# Patient Record
Sex: MALE | Race: WHITE | NOT HISPANIC OR LATINO | Employment: FULL TIME | URBAN - METROPOLITAN AREA
[De-identification: names, ages, dates, MRNs, and addresses within clinical notes are randomized per-mention and may not be internally consistent; named-entity substitution may affect disease eponyms.]

---

## 2023-02-26 ENCOUNTER — NURSE TRIAGE (OUTPATIENT)
Dept: NURSING | Facility: CLINIC | Age: 23
End: 2023-02-26
Payer: COMMERCIAL

## 2023-02-26 NOTE — TELEPHONE ENCOUNTER
Nosebleed today x 20-30 min. Gets nosebleeds often over past 1 year. Today has used correct nose pinching technique for 10 min continuously w/ no stopping. Did this x2 but bleeding continues. Advised ED/UC now. UC has ability to treat nosebleed. Pt asked if UC can cauterize nose; explained UC does not have cauterizing equipment but they have other tx. Only ED can cauterize. Pt voiced understanding. States he will go to .     Reason for Disposition    [1] Bleeding present > 30 minutes AND [2] using correct method of direct pressure    Additional Information    Negative: Fainted or too weak to stand following large blood loss    Negative: Sounds like a life-threatening emergency to the triager    Negative: Nosebleed followed a nose injury    Protocols used: NOSEBLEED-A-

## 2023-05-26 ENCOUNTER — LAB (OUTPATIENT)
Dept: LAB | Facility: CLINIC | Age: 23
End: 2023-05-26
Payer: COMMERCIAL

## 2023-05-26 DIAGNOSIS — Z78.9 ATTEMPTING TO CONCEIVE: ICD-10-CM

## 2023-05-26 DIAGNOSIS — Z31.69 INFERTILITY COUNSELING: ICD-10-CM

## 2023-05-26 LAB
ABNORMAL SPERM MORPHOLOGY: 100
ABSTINENCE DAYS: 3 DAYS (ref 2–7)
AGGLUTINATION: NO
ANALYSIS TEMP - CENTIGRADE: 21 CENTIGRADE
COLLECTION METHOD: ABNORMAL
COLLECTION SITE: ABNORMAL
CONSENT TO RELEASE TO PARTNER: NO
DAL- RECEIVED TIME: ABNORMAL
HEAD DEFECT: 100 %
IMMOTILE: 62 %
LIQUEFIED: YES
MIDPIECE DEFECT: 42 %
NON-PROGRESSIVE MOTILITY: 4 %
NORMAL SPERM MORPHOLOGY: 0 % NORMAL FORMS
PROGRESSIVE MOTILITY: 34 %
ROUND CELLS: 0 MILLION/ML
SPECIMEN PH: 7.2 PH
SPECIMEN VOLUME: 5 ML
SPERM CONCENTRATION: 5.3 MILLION/ML
TAIL DEFECT: 12 %
TIME OF ANALYSIS: ABNORMAL
TOTAL PROGRESSIVE MOTILE NUMBER: 9 MILLION
TOTAL SPERM NUMBER: 27 MILLION
VISCOUS: NO
VITALITY: ABNORMAL

## 2023-05-26 PROCEDURE — 89322 SEMEN ANAL STRICT CRITERIA: CPT

## 2023-08-18 ENCOUNTER — LAB (OUTPATIENT)
Dept: LAB | Facility: CLINIC | Age: 23
End: 2023-08-18
Payer: COMMERCIAL

## 2023-08-18 DIAGNOSIS — Z31.41 ENCOUNTER FOR SPERM COUNT FOR FERTILITY TESTING: ICD-10-CM

## 2023-08-18 LAB
ABNORMAL SPERM MORPHOLOGY: 100
ABSTINENCE DAYS: 4 DAYS (ref 2–7)
AGGLUTINATION: NO
ANALYSIS TEMP - CENTIGRADE: 21 CENTIGRADE
COLLECTION METHOD: ABNORMAL
COLLECTION SITE: ABNORMAL
CONSENT TO RELEASE TO PARTNER: YES
DAL- RECEIVED TIME: ABNORMAL
HEAD DEFECT: 100 %
IMMOTILE: 72 %
LIQUEFIED: YES
MIDPIECE DEFECT: 34 %
NON-PROGRESSIVE MOTILITY: 7 %
NORMAL SPERM MORPHOLOGY: 0 % NORMAL FORMS
PROGRESSIVE MOTILITY: 21 %
ROUND CELLS: 0 MILLION/ML
SPECIMEN PH: 7.2 PH
SPECIMEN VOLUME: 3.7 ML
SPERM CONCENTRATION: 0.9 MILLION/ML
TAIL DEFECT: 8 %
TIME OF ANALYSIS: ABNORMAL
TOTAL PROGRESSIVE MOTILE NUMBER: 0.63 MILLION
TOTAL SPERM NUMBER: 3 MILLION
VISCOUS: NO
VITALITY: 69 %

## 2023-08-18 PROCEDURE — 89322 SEMEN ANAL STRICT CRITERIA: CPT

## 2024-05-11 ENCOUNTER — HEALTH MAINTENANCE LETTER (OUTPATIENT)
Age: 24
End: 2024-05-11

## 2025-05-17 ENCOUNTER — HEALTH MAINTENANCE LETTER (OUTPATIENT)
Age: 25
End: 2025-05-17